# Patient Record
Sex: FEMALE | URBAN - METROPOLITAN AREA
[De-identification: names, ages, dates, MRNs, and addresses within clinical notes are randomized per-mention and may not be internally consistent; named-entity substitution may affect disease eponyms.]

---

## 2018-08-21 ENCOUNTER — APPOINTMENT (RX ONLY)
Dept: URBAN - METROPOLITAN AREA CLINIC 80 | Facility: CLINIC | Age: 48
Setting detail: DERMATOLOGY
End: 2018-08-21

## 2018-08-21 DIAGNOSIS — L71.8 OTHER ROSACEA: ICD-10-CM

## 2018-08-21 DIAGNOSIS — D485 NEOPLASM OF UNCERTAIN BEHAVIOR OF SKIN: ICD-10-CM

## 2018-08-21 PROBLEM — F41.9 ANXIETY DISORDER, UNSPECIFIED: Status: ACTIVE | Noted: 2018-08-21

## 2018-08-21 PROBLEM — F32.9 MAJOR DEPRESSIVE DISORDER, SINGLE EPISODE, UNSPECIFIED: Status: ACTIVE | Noted: 2018-08-21

## 2018-08-21 PROBLEM — D48.5 NEOPLASM OF UNCERTAIN BEHAVIOR OF SKIN: Status: ACTIVE | Noted: 2018-08-21

## 2018-08-21 PROBLEM — L85.3 XEROSIS CUTIS: Status: ACTIVE | Noted: 2018-08-21

## 2018-08-21 PROCEDURE — ? TREATMENT REGIMEN

## 2018-08-21 PROCEDURE — 99202 OFFICE O/P NEW SF 15 MIN: CPT

## 2018-08-21 PROCEDURE — ? DEFER

## 2018-08-21 PROCEDURE — ? COUNSELING

## 2018-08-21 PROCEDURE — ? PRESCRIPTION

## 2018-08-21 RX ORDER — IVERMECTIN 10 MG/G
CREAM TOPICAL
Qty: 1 | Refills: 1 | Status: ERX | COMMUNITY
Start: 2018-08-21

## 2018-08-21 RX ORDER — DOXYCYCLINE HYCLATE 150 MG/1
TABLET, COATED ORAL
Qty: 30 | Refills: 3 | Status: ERX | COMMUNITY
Start: 2018-08-21

## 2018-08-21 RX ORDER — SULFACETAMIDE SODIUM, SULFUR 100; 50 MG/G; MG/G
EMULSION TOPICAL
Qty: 1 | Refills: 7 | Status: ERX | COMMUNITY
Start: 2018-08-21

## 2018-08-21 RX ADMIN — IVERMECTIN: 10 CREAM TOPICAL at 18:48

## 2018-08-21 RX ADMIN — DOXYCYCLINE HYCLATE: 150 TABLET, COATED ORAL at 18:47

## 2018-08-21 RX ADMIN — SULFACETAMIDE SODIUM, SULFUR: 100; 50 EMULSION TOPICAL at 18:48

## 2018-08-21 ASSESSMENT — LOCATION SIMPLE DESCRIPTION DERM
LOCATION SIMPLE: LEFT EYE
LOCATION SIMPLE: RIGHT CHEEK
LOCATION SIMPLE: RIGHT EYE
LOCATION SIMPLE: LEFT CHEEK

## 2018-08-21 ASSESSMENT — LOCATION ZONE DERM
LOCATION ZONE: FACE
LOCATION ZONE: CONJUNCTIVA
LOCATION ZONE: CORNEA

## 2018-08-21 ASSESSMENT — LOCATION DETAILED DESCRIPTION DERM
LOCATION DETAILED: RIGHT CENTRAL MALAR CHEEK
LOCATION DETAILED: LEFT CENTRAL MALAR CHEEK
LOCATION DETAILED: RIGHT LATERAL SCLERA
LOCATION DETAILED: LEFT INFERIOR MEDIAL MALAR CHEEK
LOCATION DETAILED: RIGHT INFERIOR CENTRAL MALAR CHEEK
LOCATION DETAILED: LEFT IRIS

## 2018-08-21 NOTE — PROCEDURE: TREATMENT REGIMEN
Samples Given: Acticlate (3)\\nSoolantra (1)\\Darinel Wash (2)
Initiate Treatment: Acticlate 150mg take 1 pill a day\\Darinel wash use daily\\nSoolantra apply thin layer on face ice a day \\nDiscussed if meds to expensive will fax to [facility].
Detail Level: Zone
Plan: Discussed to stay away from Whey products. Mention no interaction with Ambien. Discussed using good sunscreen.
Action 3: Continue
Other Instructions: Discussed Doxycycline will help condition

## 2018-08-21 NOTE — HPI: RASH (ROSACEA)
How Severe Is Your Rosacea?: severe
Is This A New Presentation, Or A Follow-Up?: Rosacea
Additional History: Patient states right side of cheek is worse.

## 2019-03-14 ENCOUNTER — RX ONLY (OUTPATIENT)
Age: 49
Setting detail: RX ONLY
End: 2019-03-14

## 2019-03-14 RX ORDER — SULFACETAMIDE SODIUM, SULFUR 100; 50 MG/G; MG/G
EMULSION TOPICAL
Qty: 1 | Refills: 3 | Status: ERX

## 2019-03-18 ENCOUNTER — RX ONLY (OUTPATIENT)
Age: 49
Setting detail: RX ONLY
End: 2019-03-18

## 2019-03-18 RX ORDER — SULFACETAMIDE SODIUM, SULFUR 98; 48 MG/G; MG/G
LIQUID TOPICAL
Qty: 1 | Refills: 4 | Status: ERX | COMMUNITY
Start: 2019-03-18